# Patient Record
Sex: MALE | Race: OTHER | HISPANIC OR LATINO | URBAN - METROPOLITAN AREA
[De-identification: names, ages, dates, MRNs, and addresses within clinical notes are randomized per-mention and may not be internally consistent; named-entity substitution may affect disease eponyms.]

---
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
## 2025-04-23 NOTE — ED ADULT TRIAGE NOTE - HEIGHT IN INCHES
Update MRI     Blood work today     Try baclofen - this will help with the muscle tremors   Take 1 twice per day   Notify me if this makes you too sleepy or too weak     Follow up in 4 months   5